# Patient Record
Sex: FEMALE | Race: WHITE | NOT HISPANIC OR LATINO | Employment: UNEMPLOYED | URBAN - METROPOLITAN AREA
[De-identification: names, ages, dates, MRNs, and addresses within clinical notes are randomized per-mention and may not be internally consistent; named-entity substitution may affect disease eponyms.]

---

## 2022-07-05 LAB
CHLAMYDIA NUCLEIC ACID SCREEN: NEGATIVE
GONOCOCCUS BY NUCLEIC ACID AMP: NEGATIVE
PAP RECOMMENDATION EXT: NORMAL
PAP SMEAR: NORMAL
TRICHOMONAS VAGINALIS: NEGATIVE

## 2023-01-27 ENCOUNTER — DOCUMENTATION ONLY (OUTPATIENT)
Dept: ADMINISTRATIVE | Facility: HOSPITAL | Age: 23
End: 2023-01-27

## 2024-07-08 ENCOUNTER — HOSPITAL ENCOUNTER (EMERGENCY)
Facility: HOSPITAL | Age: 24
Discharge: HOME | End: 2024-07-08
Payer: MEDICAID

## 2024-07-08 VITALS
BODY MASS INDEX: 25.52 KG/M2 | RESPIRATION RATE: 16 BRPM | HEIGHT: 60 IN | OXYGEN SATURATION: 99 % | WEIGHT: 130 LBS | TEMPERATURE: 97 F | DIASTOLIC BLOOD PRESSURE: 89 MMHG | SYSTOLIC BLOOD PRESSURE: 122 MMHG | HEART RATE: 70 BPM

## 2024-07-08 DIAGNOSIS — Z3A.12 12 WEEKS GESTATION OF PREGNANCY: Primary | ICD-10-CM

## 2024-07-08 DIAGNOSIS — R10.9 ABDOMINAL PAIN: ICD-10-CM

## 2024-07-08 LAB
BILIRUB UR QL STRIP.AUTO: NEGATIVE
CLARITY UR: CLEAR
COLOR UR AUTO: YELLOW
GLUCOSE UR QL STRIP: NEGATIVE
HGB UR QL STRIP: NEGATIVE
KETONES UR QL STRIP: NEGATIVE
LEUKOCYTE ESTERASE UR QL STRIP: NEGATIVE
NITRITE UR QL STRIP: NEGATIVE
PH UR STRIP: 6 [PH]
PROT UR QL STRIP: NEGATIVE
SP GR UR STRIP.AUTO: 1.01 (ref 1–1.03)
UROBILINOGEN UR STRIP-ACNC: 0.2

## 2024-07-08 PROCEDURE — 81003 URINALYSIS AUTO W/O SCOPE: CPT

## 2024-07-08 PROCEDURE — 99284 EMERGENCY DEPT VISIT MOD MDM: CPT | Mod: 25

## 2024-07-08 NOTE — Clinical Note
"Olive Schneiderchelle Romero was seen and treated in our emergency department on 7/8/2024.  She may return to work on 07/10/2024.       If you have any questions or concerns, please don't hesitate to call.      Joo Arango MD"

## 2024-07-09 NOTE — ED PROVIDER NOTES
Encounter Date: 2024       History     Chief Complaint   Patient presents with    Pregnancy Issues     Reports she lifted up on a weighted blanket approx an hour ago and started having pain on her left side that then radiated to the middle of her stomach. Reports the pain comes and goes. Denies any vaginal bleeding. Wants to verify the baby is okay. OB is [facility]     This 24-year-old female patient presents with complaints intermittent abdominal pain that has improved over past few hours.  She lifted a weighted blanket today multiple times and about 20 minutes afterwards started having lower abdominal pain.  She denies vaginal discharge, watery fluid or bleeding.  She says she just wants to get her baby checked      Review of patient's allergies indicates:  No Known Allergies  Past Medical History:   Diagnosis Date    Depression      Past Surgical History:   Procedure Laterality Date     SECTION       No family history on file.     Review of Systems   Gastrointestinal:  Positive for abdominal pain.   Genitourinary:  Negative for vaginal bleeding and vaginal discharge.   All other systems reviewed and are negative.      Physical Exam     Initial Vitals [24]   BP Pulse Resp Temp SpO2   (!) 133/94 82 18 98.4 °F (36.9 °C) 100 %      MAP       --         Physical Exam    Nursing note and vitals reviewed.  Constitutional: She appears well-developed and well-nourished.   HENT:   Head: Normocephalic and atraumatic.   Eyes: Pupils are equal, round, and reactive to light.   Neck: Neck supple.   Normal range of motion.  Cardiovascular:  Normal rate, regular rhythm and normal heart sounds.           Pulmonary/Chest: Breath sounds normal.   Abdominal: Abdomen is soft. Bowel sounds are normal. There is no abdominal tenderness.   Musculoskeletal:         General: Normal range of motion.      Cervical back: Normal range of motion and neck supple.     Neurological: She is alert and oriented to person, place,  and time.   Skin: Skin is warm and dry. Capillary refill takes less than 2 seconds.   Psychiatric: She has a normal mood and affect. Her behavior is normal. Judgment and thought content normal.         ED Course   Procedures  Labs Reviewed   URINALYSIS, REFLEX TO URINE CULTURE - Normal    Narrative:      URINE STABILITY IS 2 HOURS AT ROOM TEMP OR    SIX HOURS REFRIGERATED. PERFORMING TESTING ON    SPECIMENS GREATER THAN THIS AGE MAY AFFECT THE    FOLLOWING TESTS:    PH          SPECIFIC GRAVITY           BLOOD    CLARITY     BILIRUBIN               UROBILINOGEN          Imaging Results    None          Medications - No data to display  Medical Decision Making  This 24-year-old female patient presents with complaints intermittent abdominal pain that has improved over past few hours.  She lifted a weighted blanket today multiple times and about 20 minutes afterwards started having lower abdominal pain.  She denies vaginal discharge, watery fluid or bleeding.  She says she just wants to get her baby checked  Ultrasound and urinalysis ordered  ER diagnosis-abdominal pain  Differential diagnosis includes but is not limited to threatened miscarriage, this conference of pregnancy, both of these diagnoses are dependent on ultrasound and urinalysis results.  This patient care will be assumed by Dr. Arango    Amount and/or Complexity of Data Reviewed  Radiology: ordered.                                      Clinical Impression:  Final diagnoses:  [R10.9] Abdominal pain                 Janey Benavides, FNP  07/08/24 1053

## 2024-07-09 NOTE — ED PROVIDER NOTES
Encounter Date: 2024       History     Chief Complaint   Patient presents with    Pregnancy Issues     Reports she lifted up on a weighted blanket approx an hour ago and started having pain on her left side that then radiated to the middle of her stomach. Reports the pain comes and goes. Denies any vaginal bleeding. Wants to verify the baby is okay. OB is [facility]  Review of patient's allergies indicates:  No Known Allergies  Past Medical History:   Diagnosis Date    Depression      Past Surgical History:   Procedure Laterality Date     SECTION       No family history on file.     Review of Systems    Physical Exam     Initial Vitals [24]   BP Pulse Resp Temp SpO2   (!) 133/94 82 18 98.4 °F (36.9 °C) 100 %      MAP       --         Physical Exam    ED Course   Procedures  Labs Reviewed   URINALYSIS, REFLEX TO URINE CULTURE - Normal    Narrative:      URINE STABILITY IS 2 HOURS AT ROOM TEMP OR    SIX HOURS REFRIGERATED. PERFORMING TESTING ON    SPECIMENS GREATER THAN THIS AGE MAY AFFECT THE    FOLLOWING TESTS:    PH          SPECIFIC GRAVITY           BLOOD    CLARITY     BILIRUBIN               UROBILINOGEN          Imaging Results              US OB 14+ Wks, TransAbd, Single Gestation (In process)                      Medications - No data to display  Medical Decision Making  Amount and/or Complexity of Data Reviewed  Radiology: ordered. Decision-making details documented in ED Course.               ED Course as of 24 US OB 14+ Wks, TransAbd, Single Gestation  IUP, 12 weeks and 6 days, nontender, there was some placenta previa, fetal heart rate 162 [TM]      ED Course User Index  [TM] Joo Arango MD                           Clinical Impression:  Final diagnoses:  [R10.9] Abdominal pain  [Z3A.12] 12 weeks gestation of pregnancy (Primary)          ED Disposition Condition    Discharge Good          ED Prescriptions    None       Follow-up  Information       Follow up With Specialties Details Why Contact Info    Obstetrician  Call in 1 day               Joo Arango MD  07/08/24 3004

## 2025-01-06 ENCOUNTER — HOSPITAL ENCOUNTER (EMERGENCY)
Facility: HOSPITAL | Age: 25
Discharge: HOME | End: 2025-01-06
Payer: MEDICAID

## 2025-01-06 VITALS
TEMPERATURE: 98 F | BODY MASS INDEX: 30.21 KG/M2 | HEIGHT: 61 IN | OXYGEN SATURATION: 97 % | DIASTOLIC BLOOD PRESSURE: 80 MMHG | HEART RATE: 119 BPM | RESPIRATION RATE: 20 BRPM | SYSTOLIC BLOOD PRESSURE: 122 MMHG | WEIGHT: 160 LBS

## 2025-01-06 DIAGNOSIS — J10.1 INFLUENZA A: Primary | ICD-10-CM

## 2025-01-06 LAB
BACTERIA #/AREA URNS AUTO: ABNORMAL /HPF
BILIRUB UR QL STRIP.AUTO: ABNORMAL
CLARITY UR: CLEAR
COLOR UR AUTO: YELLOW
GLUCOSE UR QL STRIP: NEGATIVE
GRAN CASTS URNS QL MICRO: ABNORMAL /LPF
HGB UR QL STRIP: NEGATIVE
HYALINE CASTS URNS QL MICRO: ABNORMAL /LPF
INFLUENZA A (OHS): POSITIVE
INFLUENZA B (OHS): NEGATIVE
KETONES UR QL STRIP: 40
LEUKOCYTE ESTERASE UR QL STRIP: NEGATIVE
NITRITE UR QL STRIP: NEGATIVE
PH UR STRIP: 6 [PH]
PROT UR QL STRIP: 100
RBC #/AREA URNS AUTO: ABNORMAL /HPF
RSV ANTIGEN (OHS): NEGATIVE
SARS-COV-2 RDRP RESP QL NAA+PROBE: NEGATIVE
SP GR UR STRIP.AUTO: >=1.03 (ref 1–1.03)
SQUAMOUS #/AREA URNS AUTO: ABNORMAL /HPF
UROBILINOGEN UR STRIP-ACNC: >=8
WBC #/AREA URNS AUTO: ABNORMAL /HPF

## 2025-01-06 PROCEDURE — 87807 RSV ASSAY W/OPTIC: CPT | Performed by: NURSE PRACTITIONER

## 2025-01-06 PROCEDURE — 99283 EMERGENCY DEPT VISIT LOW MDM: CPT

## 2025-01-06 PROCEDURE — U0002 COVID-19 LAB TEST NON-CDC: HCPCS | Performed by: NURSE PRACTITIONER

## 2025-01-06 PROCEDURE — 81003 URINALYSIS AUTO W/O SCOPE: CPT | Performed by: NURSE PRACTITIONER

## 2025-01-06 PROCEDURE — 81015 MICROSCOPIC EXAM OF URINE: CPT | Performed by: NURSE PRACTITIONER

## 2025-01-06 PROCEDURE — 87086 URINE CULTURE/COLONY COUNT: CPT | Performed by: NURSE PRACTITIONER

## 2025-01-06 PROCEDURE — 87400 INFLUENZA A/B EACH AG IA: CPT | Performed by: NURSE PRACTITIONER

## 2025-01-06 RX ORDER — OSELTAMIVIR PHOSPHATE 75 MG/1
75 CAPSULE ORAL 2 TIMES DAILY
Qty: 10 CAPSULE | Refills: 0 | Status: SHIPPED | OUTPATIENT
Start: 2025-01-06 | End: 2025-01-11

## 2025-01-06 RX ORDER — OSELTAMIVIR PHOSPHATE 75 MG/1
75 CAPSULE ORAL 2 TIMES DAILY
Qty: 10 CAPSULE | Refills: 0 | Status: SHIPPED | OUTPATIENT
Start: 2025-01-06 | End: 2025-01-06

## 2025-01-06 NOTE — ED PROVIDER NOTES
"Encounter Date: 2025       History     Chief Complaint   Patient presents with    Cough    Nasal Congestion    Generalized Body Aches     Pt reports she has been having a cough for the last 3 days and now she is having vomiting at home. No active vomiting in triage. Pt also reports generalized body aches stating :every muscle in my body hurts". Pt is 37 weeks gestation. OB is [facility]     24-year-old female presents with cough x3 days and vomiting with any oral intake.  Denies nausea.  Patient is 37 weeks gestation OB is Dr. Barker.  Patient states her body aches every single muscle hurts.     The history is provided by the patient. No  was used.     Review of patient's allergies indicates:  No Known Allergies  Past Medical History:   Diagnosis Date    Depression      Past Surgical History:   Procedure Laterality Date     SECTION       No family history on file.  Social History     Tobacco Use    Smoking status: Never    Smokeless tobacco: Never     Review of Systems   Respiratory:  Positive for cough.    Gastrointestinal:  Positive for vomiting.   Musculoskeletal:  Positive for myalgias.   All other systems reviewed and are negative.      Physical Exam     Initial Vitals [25 1236]   BP Pulse Resp Temp SpO2   122/80 (!) 119 20 97.9 °F (36.6 °C) 97 %      MAP       --         Physical Exam    Nursing note and vitals reviewed.  Constitutional: She appears well-developed and well-nourished.   HENT:   Head: Normocephalic and atraumatic.   Eyes: Conjunctivae and EOM are normal. Pupils are equal, round, and reactive to light.   Neck: Neck supple.   Normal range of motion.  Cardiovascular:  Normal rate, regular rhythm, normal heart sounds and intact distal pulses.           Pulmonary/Chest: Breath sounds normal. She has no wheezes. She has no rhonchi. She has no rales.   Dry hacking cough is noted.   Abdominal: Abdomen is soft. Bowel sounds are normal.   Musculoskeletal:         " General: Normal range of motion.      Cervical back: Normal range of motion and neck supple.     Neurological: She is alert and oriented to person, place, and time. She has normal strength. GCS score is 15. GCS eye subscore is 4. GCS verbal subscore is 5. GCS motor subscore is 6.   Skin: Skin is warm and dry. Capillary refill takes less than 2 seconds.   Psychiatric: She has a normal mood and affect. Her behavior is normal. Judgment and thought content normal.         ED Course   Procedures  Labs Reviewed   RAPID INFLUENZA A/B - Abnormal       Result Value    Influenza A Positive (*)     Influenza B Negative     URINALYSIS, REFLEX TO URINE CULTURE - Abnormal    Color, UA Yellow      Appearance, UA Clear      Specific Gravity, UA >=1.030      pH, UA 6.0      Protein,  (*)     Glucose, UA Negative      Ketones, UA 40 (*)     Blood, UA Negative      Bilirubin, UA Moderate (*)     Urobilinogen, UA >=8.0 (*)     Nitrites, UA Negative      Leukocyte Esterase, UA Negative      Narrative:      URINE STABILITY IS 2 HOURS AT ROOM TEMP OR    SIX HOURS REFRIGERATED. PERFORMING TESTING ON    SPECIMENS GREATER THAN THIS AGE MAY AFFECT THE    FOLLOWING TESTS:    PH          SPECIFIC GRAVITY           BLOOD    CLARITY     BILIRUBIN               UROBILINOGEN   URINALYSIS, MICROSCOPIC - Abnormal    Bacteria, UA Many (*)     Hyaline Casts, UA Few (*)     Granular Casts, UA Occasional (*)     RBC, UA 0-5      WBC, UA 6-10 (*)     Squamous Epithelial Cells, UA Moderate (*)    SARS-COV-2 RNA AMPLIFICATION, QUAL - Normal    SARS COV-2 Molecular Negative      Narrative:     The IDNOW COVID-19 assay is a rapid molecular in vitro diagnostic test utilizing an isothermal nucleic acid amplification technology intended for the qualitative detection of nucleic acid from the SARS-CoV-2 viral RNA in direct nasal, nasopharyngeal or throat swabs from individuals who are suspected of COVID-19 by their healthcare provider.   RAPID RSV - Normal     RSV Negative     CULTURE, URINE          Imaging Results    None          Medications - No data to display  Medical Decision Making  Problems Addressed:  Influenza A: acute illness or injury    Risk  Prescription drug management.                                      Clinical Impression:  Final diagnoses:  [J10.1] Influenza A (Primary)          ED Disposition Condition    Discharge Stable          ED Prescriptions       Medication Sig Dispense Start Date End Date Auth. Provider    oseltamivir (TAMIFLU) 75 MG capsule  (Status: Discontinued) Take 1 capsule (75 mg total) by mouth 2 (two) times daily. for 5 days 10 capsule 1/6/2025 1/6/2025 Rhina Baez FNP    oseltamivir (TAMIFLU) 75 MG capsule Take 1 capsule (75 mg total) by mouth 2 (two) times daily. for 5 days 10 capsule 1/6/2025 1/11/2025 Rhina Baez FNP          Follow-up Information       Follow up With Specialties Details Why [facility] Info [facility] In [facility] days If symptoms worsen [id number] [facility] [id number] [phone]             Rhina Baez FNP  01/06/25 1435       Rhina Baez FNP  01/06/25 1446

## 2025-01-06 NOTE — Clinical Note
"Olive Marmolejo" Nick was seen and treated in our emergency department on 1/6/2025.  She may return to work on 01/10/2025.       If you have any questions or concerns, please don't hesitate to call.      Rhina Baez, REGAN"

## 2025-01-08 LAB — BACTERIA UR CULT: NORMAL
